# Patient Record
Sex: MALE | URBAN - METROPOLITAN AREA
[De-identification: names, ages, dates, MRNs, and addresses within clinical notes are randomized per-mention and may not be internally consistent; named-entity substitution may affect disease eponyms.]

---

## 2017-12-21 ENCOUNTER — APPOINTMENT (RX ONLY)
Dept: URBAN - METROPOLITAN AREA CLINIC 80 | Facility: CLINIC | Age: 62
Setting detail: DERMATOLOGY
End: 2017-12-21

## 2017-12-21 DIAGNOSIS — D485 NEOPLASM OF UNCERTAIN BEHAVIOR OF SKIN: ICD-10-CM

## 2017-12-21 PROBLEM — D48.5 NEOPLASM OF UNCERTAIN BEHAVIOR OF SKIN: Status: ACTIVE | Noted: 2017-12-21

## 2017-12-21 PROBLEM — K75.9 INFLAMMATORY LIVER DISEASE, UNSPECIFIED: Status: ACTIVE | Noted: 2017-12-21

## 2017-12-21 PROCEDURE — ? COUNSELING

## 2017-12-21 PROCEDURE — ? TREATMENT REGIMEN

## 2017-12-21 PROCEDURE — ? PRESCRIPTION

## 2017-12-21 PROCEDURE — 99202 OFFICE O/P NEW SF 15 MIN: CPT

## 2017-12-21 RX ORDER — FLUOROURACIL 2 G/40G
CREAM TOPICAL
Qty: 1 | Refills: 0 | Status: ERX | COMMUNITY
Start: 2017-12-21

## 2017-12-21 RX ADMIN — FLUOROURACIL: 2 CREAM TOPICAL at 14:42

## 2017-12-21 ASSESSMENT — LOCATION SIMPLE DESCRIPTION DERM: LOCATION SIMPLE: RIGHT SHOULDER

## 2017-12-21 ASSESSMENT — LOCATION ZONE DERM: LOCATION ZONE: ARM

## 2017-12-21 ASSESSMENT — LOCATION DETAILED DESCRIPTION DERM: LOCATION DETAILED: RIGHT ANTERIOR SHOULDER

## 2017-12-21 NOTE — PROCEDURE: COUNSELING
Detail Level: Detailed
Patient Specific Counseling (Will Not Stick From Patient To Patient): \\nOffer patient biopsy today but declined. Will try effudex and if no reaction/improvement will biopsy.